# Patient Record
Sex: FEMALE | Race: BLACK OR AFRICAN AMERICAN | Employment: FULL TIME | URBAN - METROPOLITAN AREA
[De-identification: names, ages, dates, MRNs, and addresses within clinical notes are randomized per-mention and may not be internally consistent; named-entity substitution may affect disease eponyms.]

---
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
## 2022-07-21 ENCOUNTER — HOSPITAL ENCOUNTER (EMERGENCY)
Age: 22
Discharge: HOME | End: 2022-07-21
Attending: EMERGENCY MEDICINE
Payer: COMMERCIAL

## 2022-07-21 ENCOUNTER — APPOINTMENT (OUTPATIENT)
Dept: MRI IMAGING | Age: 22
End: 2022-07-21
Attending: EMERGENCY MEDICINE
Payer: COMMERCIAL

## 2022-07-21 VITALS
SYSTOLIC BLOOD PRESSURE: 115 MMHG | HEART RATE: 98 BPM | DIASTOLIC BLOOD PRESSURE: 73 MMHG | OXYGEN SATURATION: 99 % | RESPIRATION RATE: 17 BRPM | WEIGHT: 205.91 LBS | TEMPERATURE: 97 F

## 2022-07-21 DIAGNOSIS — R20.0 NUMBNESS AND TINGLING OF BOTH LOWER EXTREMITIES: Primary | ICD-10-CM

## 2022-07-21 DIAGNOSIS — R20.0 PERINEAL NUMBNESS: ICD-10-CM

## 2022-07-21 DIAGNOSIS — R20.2 NUMBNESS AND TINGLING OF BOTH LOWER EXTREMITIES: Primary | ICD-10-CM

## 2022-07-21 PROCEDURE — 72148 MRI LUMBAR SPINE W/O DYE: CPT

## 2022-07-21 PROCEDURE — 99284 EMERGENCY DEPT VISIT MOD MDM: CPT

## 2022-07-21 NOTE — ED NOTES
Triage note: Patient stating that she fell down a grassy hill on a skateboard about a month ago. Denies LOC but had back pain and pins and needles in lower back to legs. Tuesday patient woke with numbness to both legs, unable to feel her vagina, and unable to feel her rectum when pushing to have a BM. Patient having no difficulty walking.

## 2022-07-21 NOTE — Clinical Note
Sharmin Gonzalez was seen and treated in our emergency department on 7/21/2022. Light duty for 1 week. No significant lifting.     Ronald Arias MD

## 2022-07-21 NOTE — ED PROVIDER NOTES
History of asthma. She presents accompanied by her girlfriend with complaints of lower extremity numbness. She states that she fell approximately 1 month ago while attempting to ride a skateboard down a grassy hill. She landed on her right buttock and low back. She had pain and bruising after the fall.  2 days ago she awoke with lower extremity numbness from her hips down to her toes. She describes it as a pins-and-needles sensation. It has been persistent since then. She states that the numbness is worse over her posterior thighs and below her knees. She has better sensation over her anterior thighs. Yesterday she began to note numbness over her vaginal area when she wiped after urinating. She also (since yesterday) feels a numbing sensation around her anus when she pushes to have a bowel movement. She denies incontinence. No lower extremity weakness. No trouble walking. She denies any significant pain. Past Medical History:   Diagnosis Date    Asthma     Second hand smoke exposure        No past surgical history on file. History reviewed. No pertinent family history.     Social History     Socioeconomic History    Marital status: SINGLE     Spouse name: Not on file    Number of children: Not on file    Years of education: Not on file    Highest education level: Not on file   Occupational History    Not on file   Tobacco Use    Smoking status: Some Days    Smokeless tobacco: Current    Tobacco comments:     socially   Substance and Sexual Activity    Alcohol use: No    Drug use: Yes     Types: Marijuana    Sexual activity: Never   Other Topics Concern    Not on file   Social History Narrative    Not on file     Social Determinants of Health     Financial Resource Strain: Not on file   Food Insecurity: Not on file   Transportation Needs: Not on file   Physical Activity: Not on file   Stress: Not on file   Social Connections: Not on file   Intimate Partner Violence: Not on file   Housing Stability: Not on file         ALLERGIES: Patient has no known allergies. Review of Systems   All other systems reviewed and are negative. Vitals:    07/21/22 1909 07/21/22 1914   BP:  115/73   Pulse:  98   Resp:  17   Temp:  97 °F (36.1 °C)   SpO2:  99%   Weight: 93.4 kg (205 lb 14.6 oz)             Physical Exam  Vitals and nursing note reviewed. Constitutional:       Appearance: She is well-developed. HENT:      Head: Normocephalic and atraumatic. Eyes:      Conjunctiva/sclera: Conjunctivae normal.   Neck:      Trachea: No tracheal deviation. Cardiovascular:      Rate and Rhythm: Normal rate and regular rhythm. Heart sounds: Normal heart sounds. No murmur heard. No friction rub. No gallop. Pulmonary:      Effort: Pulmonary effort is normal.      Breath sounds: Normal breath sounds. Abdominal:      Palpations: Abdomen is soft. Tenderness: There is no abdominal tenderness. Musculoskeletal:         General: No deformity. Cervical back: Neck supple. Skin:     General: Skin is warm and dry. Neurological:      Mental Status: She is alert. Comments: oriented. Decreased sensation to light touch throughout her lower extremities. Normal strength. MDM         Procedures    Consult note: Dr. Quynh Bonilla (neurosurgery) -agrees with plan for MRI. Jonah Majano MD  8:38 PM    Progress Note:  Results, treatment, and follow up plan have been discussed with patient/girlfriend. Questions were answered. Jonah Majano MD  9:51 PM    Assessment/plan: She is status post fall 1 month ago. She presented today with a 2-day history of lower extremity numbness and tingling. She also complains of decreased sensation in her vaginal and anal areas. No incontinence. No weakness. She remains ambulatory. After discussion with neurosurgery, it was decided to perform an MRI to rule out cauda equina syndrome. MRI is negative for that. It does show:    1.   No evidence of cauda equina compression. 2.  No significant spinal canal stenosis. L4-L5 mild LEFT neuroforaminal  stenosis. Follow-up PCP. Return precautions.   Nehemias Zamora MD  9:54 PM

## 2022-07-22 NOTE — ED NOTES
Patient provided with hospital gown and blanket to change into for MRI. Educated to remove any jewelery or anything containing metal prior to procedure.

## 2022-07-25 ENCOUNTER — OFFICE VISIT (OUTPATIENT)
Dept: INTERNAL MEDICINE CLINIC | Age: 22
End: 2022-07-25
Payer: COMMERCIAL

## 2022-07-25 VITALS
WEIGHT: 210 LBS | BODY MASS INDEX: 33.75 KG/M2 | SYSTOLIC BLOOD PRESSURE: 106 MMHG | HEIGHT: 66 IN | RESPIRATION RATE: 16 BRPM | HEART RATE: 97 BPM | OXYGEN SATURATION: 95 % | DIASTOLIC BLOOD PRESSURE: 72 MMHG

## 2022-07-25 DIAGNOSIS — G57.93 NEUROPATHY INVOLVING BOTH LOWER EXTREMITIES: ICD-10-CM

## 2022-07-25 DIAGNOSIS — M54.41 ACUTE BILATERAL LOW BACK PAIN WITH BILATERAL SCIATICA: Primary | ICD-10-CM

## 2022-07-25 DIAGNOSIS — M54.42 ACUTE BILATERAL LOW BACK PAIN WITH BILATERAL SCIATICA: Primary | ICD-10-CM

## 2022-07-25 DIAGNOSIS — G57.03 PIRIFORMIS SYNDROME OF BOTH SIDES: ICD-10-CM

## 2022-07-25 PROCEDURE — 99214 OFFICE O/P EST MOD 30 MIN: CPT | Performed by: FAMILY MEDICINE

## 2022-07-25 RX ORDER — NAPROXEN 500 MG/1
500 TABLET ORAL 2 TIMES DAILY WITH MEALS
Qty: 30 TABLET | Refills: 0 | Status: SHIPPED | OUTPATIENT
Start: 2022-07-25

## 2022-07-25 RX ORDER — PREDNISONE 20 MG/1
20 TABLET ORAL 3 TIMES DAILY
Qty: 21 TABLET | Refills: 0 | Status: SHIPPED | OUTPATIENT
Start: 2022-07-25 | End: 2022-08-01

## 2022-07-25 NOTE — PROGRESS NOTES
Chief Complaint   Patient presents with    Generalized Body Aches     Patient is here for back,leg and butt pain      she is a 25y.o. year old female who presents for evaluation of  numbness, back, leg,butt and hip pain   Pain Assessment Encounter      Duong Olson  [birth year]  Onset of Symptoms: Patient states has had pain for weeks   ________________________________________________________________________  Description:Patient states she fell off a skate board a month ago, but did not start having pains until last Tuesday     Frequency: 5 times a day  Pain Scale:(1-10): 3  Trauma Hx: skate board  Hx of similar symptoms: No:   Radiation: YES, foot, ankle, leg, thigh, hip, and low back  Duration:  continuous      Progression: has worsened  What makes it better?: OTC meds  What makes it worse?:exercise and walking  Medications tried: acetaminophen, ibuprofen    Reviewed and agree with Nurse Note and duplicated in this note. Reviewed PmHx, RxHx, FmHx, SocHx, AllgHx and updated and dated in the chart. No family history on file.     Past Medical History:   Diagnosis Date    Asthma     Second hand smoke exposure       Social History     Socioeconomic History    Marital status: SINGLE   Tobacco Use    Smoking status: Some Days    Smokeless tobacco: Current    Tobacco comments:     socially   Substance and Sexual Activity    Alcohol use: No    Drug use: Yes     Types: Marijuana    Sexual activity: Never     Social Determinants of Health     Physical Activity: Insufficiently Active    Days of Exercise per Week: 2 days    Minutes of Exercise per Session: 30 min        Review of Systems - negative except as listed above      Objective:     Vitals:    07/25/22 1613   BP: 106/72   Pulse: 97   Resp: 16   SpO2: 95%   Weight: 210 lb (95.3 kg)   Height: 5' 5.5\" (1.664 m)       Physical Examination: General appearance - alert, well appearing, and in no distress  Back exam - normal reflexes and strength bilateral lower extremities, sensory exam intact bilateral lower extremities  Neurological - alert, oriented, normal speech, no focal findings or movement disorder noted  Musculoskeletal - no joint tenderness, deformity or swelling  Extremities - peripheral pulses normal, no pedal edema, no clubbing or cyanosis  Skin - normal coloration and turgor, no rashes, no suspicious skin lesions noted     Assessment/ Plan:   Diagnoses and all orders for this visit:    1. Acute bilateral low back pain with bilateral sciatica  -     REFERRAL TO PHYSICAL THERAPY    2. Piriformis syndrome of both sides  -     REFERRAL TO PHYSICAL THERAPY    3. Neuropathy involving both lower extremities  -     predniSONE (DELTASONE) 20 mg tablet; Take 1 Tablet by mouth three (3) times daily for 7 days.  -     naproxen (NAPROSYN) 500 mg tablet; Take 1 Tablet by mouth two (2) times daily (with meals). -     METABOLIC PANEL, BASIC; Future  -     MAGNESIUM; Future       Pathophysiology, recovery and rehabilitation process discussed and questions answered   Counseling for 30 Minutes of the total visit duration   Pictures and figures used as necessary   Provided reassurance   Monitor response to Physical Therapy   Recommend activity modification              1) Remember to stay active and/or exercise regularly (I suggest 30-45 minutes daily)   2) For reliable dietary information, go to www. [employer]AT[employer].org. You may wish to consider seeing the nutritionist at [facility] [phone], also consider the [address] I have discussed the diagnosis with the patient and the intended plan as seen in the above orders. The patient has received an after-visit summary and questions were answered concerning future plans.      Medication Side Effects and Warnings were discussed with patient,  Patient Labs were reviewed and or requested, and  Patient Past Records were reviewed and or requested  yes      Pt agrees to call or return to clinic and/or go to closest ER with any worsening of symptoms. This may include, but not limited to increased fever (>100.4) with NSAIDS or Tylenol, increased edema, confusion, rash, worsening of presenting symptoms. Please note that this dictation was completed with [employer], the computer voice recognition software. Quite often unanticipated grammatical, syntax, homophones, and other interpretive errors are inadvertently transcribed by the computer software. Please disregard these errors. Please excuse any errors that have escaped final proofreading. Thank you.

## 2022-07-27 LAB
BUN SERPL-MCNC: 9 MG/DL (ref 6–20)
BUN/CREAT SERPL: 11 (ref 9–23)
CALCIUM SERPL-MCNC: 9 MG/DL (ref 8.7–10.2)
CHLORIDE SERPL-SCNC: 104 MMOL/L (ref 96–106)
CO2 SERPL-SCNC: 21 MMOL/L (ref 20–29)
CREAT SERPL-MCNC: 0.84 MG/DL (ref 0.57–1)
EGFR: 103 ML/MIN/1.73
GLUCOSE SERPL-MCNC: 86 MG/DL (ref 65–99)
MAGNESIUM SERPL-MCNC: 2 MG/DL (ref 1.6–2.3)
POTASSIUM SERPL-SCNC: 4.5 MMOL/L (ref 3.5–5.2)
SODIUM SERPL-SCNC: 138 MMOL/L (ref 134–144)

## 2022-09-17 ENCOUNTER — APPOINTMENT (OUTPATIENT)
Dept: GENERAL RADIOLOGY | Age: 22
DRG: 203 | End: 2022-09-17
Attending: EMERGENCY MEDICINE
Payer: COMMERCIAL

## 2022-09-17 ENCOUNTER — HOSPITAL ENCOUNTER (INPATIENT)
Age: 22
LOS: 2 days | Discharge: HOME | DRG: 203 | End: 2022-09-20
Attending: EMERGENCY MEDICINE | Admitting: PEDIATRICS
Payer: COMMERCIAL

## 2022-09-17 DIAGNOSIS — J45.902 ASTHMA WITH STATUS ASTHMATICUS, UNSPECIFIED ASTHMA SEVERITY, UNSPECIFIED WHETHER PERSISTENT: Primary | ICD-10-CM

## 2022-09-17 LAB
ALBUMIN SERPL-MCNC: 3.4 G/DL (ref 3.5–5)
ALBUMIN/GLOB SERPL: 0.7 {RATIO} (ref 1.1–2.2)
ALP SERPL-CCNC: 74 U/L (ref 45–117)
ALT SERPL-CCNC: 20 U/L (ref 12–78)
ANION GAP SERPL CALC-SCNC: 7 MMOL/L (ref 5–15)
AST SERPL-CCNC: 9 U/L (ref 15–37)
BASOPHILS # BLD: 0 K/UL (ref 0–0.1)
BASOPHILS NFR BLD: 0 % (ref 0–1)
BILIRUB SERPL-MCNC: 0.3 MG/DL (ref 0.2–1)
BUN SERPL-MCNC: 12 MG/DL (ref 6–20)
BUN/CREAT SERPL: 12 (ref 12–20)
CALCIUM SERPL-MCNC: 8.6 MG/DL (ref 8.5–10.1)
CHLORIDE SERPL-SCNC: 107 MMOL/L (ref 97–108)
CO2 SERPL-SCNC: 25 MMOL/L (ref 21–32)
COMMENT, HOLDF: NORMAL
CREAT SERPL-MCNC: 0.98 MG/DL (ref 0.55–1.02)
DIFFERENTIAL METHOD BLD: NORMAL
EOSINOPHIL # BLD: 0.2 K/UL (ref 0–0.4)
EOSINOPHIL NFR BLD: 2 % (ref 0–7)
ERYTHROCYTE [DISTWIDTH] IN BLOOD BY AUTOMATED COUNT: 13.4 % (ref 11.5–14.5)
FLUAV RNA SPEC QL NAA+PROBE: NOT DETECTED
FLUBV RNA SPEC QL NAA+PROBE: NOT DETECTED
GLOBULIN SER CALC-MCNC: 4.7 G/DL (ref 2–4)
GLUCOSE SERPL-MCNC: 171 MG/DL (ref 65–100)
HCT VFR BLD AUTO: 40.4 % (ref 35–47)
HGB BLD-MCNC: 13.1 G/DL (ref 11.5–16)
IMM GRANULOCYTES # BLD AUTO: 0 K/UL (ref 0–0.04)
IMM GRANULOCYTES NFR BLD AUTO: 0 % (ref 0–0.5)
LYMPHOCYTES # BLD: 1.9 K/UL (ref 0.8–3.5)
LYMPHOCYTES NFR BLD: 19 % (ref 12–49)
MCH RBC QN AUTO: 27.1 PG (ref 26–34)
MCHC RBC AUTO-ENTMCNC: 32.4 G/DL (ref 30–36.5)
MCV RBC AUTO: 83.5 FL (ref 80–99)
MONOCYTES # BLD: 0.9 K/UL (ref 0–1)
MONOCYTES NFR BLD: 9 % (ref 5–13)
NEUTS SEG # BLD: 7.1 K/UL (ref 1.8–8)
NEUTS SEG NFR BLD: 70 % (ref 32–75)
NRBC # BLD: 0 K/UL (ref 0–0.01)
NRBC BLD-RTO: 0 PER 100 WBC
PLATELET # BLD AUTO: 360 K/UL (ref 150–400)
PMV BLD AUTO: 9.5 FL (ref 8.9–12.9)
POTASSIUM SERPL-SCNC: 3 MMOL/L (ref 3.5–5.1)
PROT SERPL-MCNC: 8.1 G/DL (ref 6.4–8.2)
RBC # BLD AUTO: 4.84 M/UL (ref 3.8–5.2)
SAMPLES BEING HELD,HOLD: NORMAL
SARS-COV-2, COV2: NOT DETECTED
SODIUM SERPL-SCNC: 139 MMOL/L (ref 136–145)
WBC # BLD AUTO: 10.2 K/UL (ref 3.6–11)

## 2022-09-17 PROCEDURE — 36415 COLL VENOUS BLD VENIPUNCTURE: CPT

## 2022-09-17 PROCEDURE — 80053 COMPREHEN METABOLIC PANEL: CPT

## 2022-09-17 PROCEDURE — 94644 CONT INHLJ TX 1ST HOUR: CPT

## 2022-09-17 PROCEDURE — 71045 X-RAY EXAM CHEST 1 VIEW: CPT

## 2022-09-17 PROCEDURE — 0202U NFCT DS 22 TRGT SARS-COV-2: CPT

## 2022-09-17 PROCEDURE — 74011000250 HC RX REV CODE- 250: Performed by: EMERGENCY MEDICINE

## 2022-09-17 PROCEDURE — 96374 THER/PROPH/DIAG INJ IV PUSH: CPT

## 2022-09-17 PROCEDURE — 85025 COMPLETE CBC W/AUTO DIFF WBC: CPT

## 2022-09-17 PROCEDURE — 96375 TX/PRO/DX INJ NEW DRUG ADDON: CPT

## 2022-09-17 PROCEDURE — 94640 AIRWAY INHALATION TREATMENT: CPT

## 2022-09-17 PROCEDURE — 87636 SARSCOV2 & INF A&B AMP PRB: CPT

## 2022-09-17 PROCEDURE — 99285 EMERGENCY DEPT VISIT HI MDM: CPT

## 2022-09-17 PROCEDURE — 74011250636 HC RX REV CODE- 250/636: Performed by: EMERGENCY MEDICINE

## 2022-09-17 RX ORDER — MAGNESIUM SULFATE HEPTAHYDRATE 40 MG/ML
2 INJECTION, SOLUTION INTRAVENOUS ONCE
Status: COMPLETED | OUTPATIENT
Start: 2022-09-17 | End: 2022-09-18

## 2022-09-17 RX ORDER — ALBUTEROL SULFATE 5 MG/ML
15 SOLUTION RESPIRATORY (INHALATION) CONTINUOUS
Status: DISPENSED | OUTPATIENT
Start: 2022-09-17 | End: 2022-09-18

## 2022-09-17 RX ADMIN — ALBUTEROL SULFATE 15 MG/HR: 5 SOLUTION RESPIRATORY (INHALATION) at 23:20

## 2022-09-17 RX ADMIN — SODIUM CHLORIDE 1000 ML: 9 INJECTION, SOLUTION INTRAVENOUS at 22:18

## 2022-09-17 RX ADMIN — ALBUTEROL SULFATE 1 DOSE: 2.5 SOLUTION RESPIRATORY (INHALATION) at 21:50

## 2022-09-17 RX ADMIN — METHYLPREDNISOLONE SODIUM SUCCINATE 125 MG: 125 INJECTION, POWDER, FOR SOLUTION INTRAMUSCULAR; INTRAVENOUS at 22:18

## 2022-09-17 RX ADMIN — MAGNESIUM SULFATE HEPTAHYDRATE 2 G: 40 INJECTION, SOLUTION INTRAVENOUS at 22:29

## 2022-09-17 RX ADMIN — ALBUTEROL SULFATE 1 DOSE: 2.5 SOLUTION RESPIRATORY (INHALATION) at 22:50

## 2022-09-17 RX ADMIN — ALBUTEROL SULFATE 1 DOSE: 2.5 SOLUTION RESPIRATORY (INHALATION) at 22:18

## 2022-09-18 PROBLEM — J45.902 STATUS ASTHMATICUS: Status: ACTIVE | Noted: 2022-09-18

## 2022-09-18 LAB
B PERT DNA SPEC QL NAA+PROBE: NOT DETECTED
BORDETELLA PARAPERTUSSIS PCR, BORPAR: NOT DETECTED
C PNEUM DNA SPEC QL NAA+PROBE: NOT DETECTED
FLUAV SUBTYP SPEC NAA+PROBE: NOT DETECTED
FLUBV RNA SPEC QL NAA+PROBE: NOT DETECTED
HADV DNA SPEC QL NAA+PROBE: NOT DETECTED
HCOV 229E RNA SPEC QL NAA+PROBE: NOT DETECTED
HCOV HKU1 RNA SPEC QL NAA+PROBE: NOT DETECTED
HCOV NL63 RNA SPEC QL NAA+PROBE: NOT DETECTED
HCOV OC43 RNA SPEC QL NAA+PROBE: NOT DETECTED
HMPV RNA SPEC QL NAA+PROBE: NOT DETECTED
HPIV1 RNA SPEC QL NAA+PROBE: NOT DETECTED
HPIV2 RNA SPEC QL NAA+PROBE: NOT DETECTED
HPIV3 RNA SPEC QL NAA+PROBE: NOT DETECTED
HPIV4 RNA SPEC QL NAA+PROBE: NOT DETECTED
M PNEUMO DNA SPEC QL NAA+PROBE: NOT DETECTED
RSV RNA SPEC QL NAA+PROBE: NOT DETECTED
RV+EV RNA SPEC QL NAA+PROBE: DETECTED
SARS-COV-2 PCR, COVPCR: NOT DETECTED

## 2022-09-18 PROCEDURE — 94762 N-INVAS EAR/PLS OXIMTRY CONT: CPT

## 2022-09-18 PROCEDURE — 94645 CONT INHLJ TX EACH ADDL HOUR: CPT

## 2022-09-18 PROCEDURE — 74011000250 HC RX REV CODE- 250: Performed by: PEDIATRICS

## 2022-09-18 PROCEDURE — 94640 AIRWAY INHALATION TREATMENT: CPT

## 2022-09-18 PROCEDURE — 2709999900 HC NON-CHARGEABLE SUPPLY

## 2022-09-18 PROCEDURE — 65613000000 HC RM ICU PEDIATRIC

## 2022-09-18 PROCEDURE — 77010033678 HC OXYGEN DAILY

## 2022-09-18 PROCEDURE — 74011000258 HC RX REV CODE- 258: Performed by: PEDIATRICS

## 2022-09-18 PROCEDURE — 74011250637 HC RX REV CODE- 250/637: Performed by: PEDIATRICS

## 2022-09-18 PROCEDURE — 74011250636 HC RX REV CODE- 250/636: Performed by: PEDIATRICS

## 2022-09-18 PROCEDURE — 77010033711 HC HIGH FLOW OXYGEN

## 2022-09-18 PROCEDURE — 77030029684 HC NEB SM VOL KT MONA -A

## 2022-09-18 PROCEDURE — 94760 N-INVAS EAR/PLS OXIMETRY 1: CPT

## 2022-09-18 RX ORDER — ALBUTEROL SULFATE 5 MG/ML
5 SOLUTION RESPIRATORY (INHALATION) CONTINUOUS
Status: DISCONTINUED | OUTPATIENT
Start: 2022-09-18 | End: 2022-09-19 | Stop reason: SDUPTHER

## 2022-09-18 RX ORDER — BUDESONIDE 0.5 MG/2ML
500 INHALANT ORAL
Status: DISCONTINUED | OUTPATIENT
Start: 2022-09-18 | End: 2022-09-19

## 2022-09-18 RX ORDER — DEXTROSE, SODIUM CHLORIDE, AND POTASSIUM CHLORIDE 5; .9; .15 G/100ML; G/100ML; G/100ML
0-100 INJECTION INTRAVENOUS CONTINUOUS
Status: DISCONTINUED | OUTPATIENT
Start: 2022-09-18 | End: 2022-09-20 | Stop reason: HOSPADM

## 2022-09-18 RX ORDER — ALBUTEROL SULFATE 5 MG/ML
15 SOLUTION RESPIRATORY (INHALATION) CONTINUOUS
Status: DISCONTINUED | OUTPATIENT
Start: 2022-09-18 | End: 2022-09-18

## 2022-09-18 RX ORDER — ACETAMINOPHEN 325 MG/1
650 TABLET ORAL
Status: DISCONTINUED | OUTPATIENT
Start: 2022-09-18 | End: 2022-09-19

## 2022-09-18 RX ORDER — IBUPROFEN 400 MG/1
400 TABLET ORAL
Status: DISCONTINUED | OUTPATIENT
Start: 2022-09-18 | End: 2022-09-18

## 2022-09-18 RX ORDER — ALBUTEROL SULFATE 0.83 MG/ML
5 SOLUTION RESPIRATORY (INHALATION)
Status: DISCONTINUED | OUTPATIENT
Start: 2022-09-18 | End: 2022-09-20 | Stop reason: HOSPADM

## 2022-09-18 RX ORDER — IBUPROFEN 400 MG/1
400 TABLET ORAL
Status: DISCONTINUED | OUTPATIENT
Start: 2022-09-18 | End: 2022-09-19

## 2022-09-18 RX ADMIN — ACETAMINOPHEN 650 MG: 325 TABLET ORAL at 16:15

## 2022-09-18 RX ADMIN — METHYLPREDNISOLONE SODIUM SUCCINATE 50 MG: 40 INJECTION, POWDER, FOR SOLUTION INTRAMUSCULAR; INTRAVENOUS at 17:56

## 2022-09-18 RX ADMIN — METHYLPREDNISOLONE SODIUM SUCCINATE 50 MG: 40 INJECTION, POWDER, FOR SOLUTION INTRAMUSCULAR; INTRAVENOUS at 12:13

## 2022-09-18 RX ADMIN — ACETAMINOPHEN 650 MG: 325 TABLET ORAL at 03:29

## 2022-09-18 RX ADMIN — ACETAMINOPHEN 650 MG: 325 TABLET ORAL at 08:50

## 2022-09-18 RX ADMIN — ALBUTEROL SULFATE 15 MG/HR: 5 SOLUTION RESPIRATORY (INHALATION) at 06:29

## 2022-09-18 RX ADMIN — ALBUTEROL SULFATE 15 MG/HR: 5 SOLUTION RESPIRATORY (INHALATION) at 10:26

## 2022-09-18 RX ADMIN — BUDESONIDE 500 MCG: 0.5 INHALANT RESPIRATORY (INHALATION) at 19:37

## 2022-09-18 RX ADMIN — FAMOTIDINE 20 MG: 10 INJECTION, SOLUTION INTRAVENOUS at 15:27

## 2022-09-18 RX ADMIN — IBUPROFEN 400 MG: 400 TABLET, FILM COATED ORAL at 12:47

## 2022-09-18 RX ADMIN — BUDESONIDE 500 MCG: 0.5 INHALANT RESPIRATORY (INHALATION) at 10:17

## 2022-09-18 RX ADMIN — POTASSIUM CHLORIDE, DEXTROSE MONOHYDRATE AND SODIUM CHLORIDE 100 ML/HR: 150; 5; 900 INJECTION, SOLUTION INTRAVENOUS at 03:31

## 2022-09-18 RX ADMIN — FAMOTIDINE 20 MG: 10 INJECTION, SOLUTION INTRAVENOUS at 03:29

## 2022-09-18 NOTE — ED NOTES
Pt educated regarding admission and plan of care. Pt verbalizes understanding. No need expressed at this time.

## 2022-09-18 NOTE — ED NOTES
Triage: Pt seen this Tuesday for Asthma exacerbation at PCP. Pt put on Prednisone and given albuterol inhaled. Pt took Neb treatment 20 min ago. No fevers, pt is COVID neg. In triage pt has expiratory wheezing, unable to speak I nfull sentences w/o SOB and periodic Desats. MD made aware. Pt given DuoNeb and placed on caridac monitor x3.

## 2022-09-18 NOTE — ED NOTES
TRANSFER - OUT REPORT:    Verbal report given to  Mayelin Ordonez RN (name) on Mekhi Lanza  being transferred to [facility]) for routine progression of care       Report consisted of patients Situation, Background, Assessment and   Recommendations(SBAR). Information from the following report(s) SBAR, ED Summary, Intake/Output, MAR, Recent Results and Cardiac Rhythm NSR was reviewed with the receiving nurse. Lines:   Peripheral IV 09/17/22 Right Antecubital (Active)   Site Assessment Clean, dry, & intact 09/17/22 2303   Phlebitis Assessment 0 09/17/22 2303   Infiltration Assessment 0 09/17/22 2303   Dressing Status Clean, dry, & intact 09/17/22 2303   Dressing Type Tape;Transparent 09/17/22 2303   Hub Color/Line Status Pink 09/17/22 2303        Opportunity for questions and clarification was provided.       Patient transported with:   Registered Nurse

## 2022-09-18 NOTE — ED PROVIDER NOTES
HPI     Please note that this dictation was completed with [employer], the computer voice recognition software. Quite often unanticipated grammatical, syntax, homophones, and other interpretive errors are inadvertently transcribed by the computer software. Please disregard these errors. Please excuse any errors that have escaped final proofreading. 74-year-old female with a history of asthma and 1 prior admission for such here with 5 days of cough, congestion and wheezing. She has been using albuterol MDI about every 3 hours without significant relief. She was seen at an urgent care 5 days ago and prescribed steroids and an inhaler. It sounds like she had a Medrol Dosepak and has 1 more day left tomorrow. Denies any fevers, vomiting, sick contacts or other complaints. Social history: Positive smoker. Positive marijuana. Past Medical History:   Diagnosis Date    Asthma     Second hand smoke exposure        History reviewed. No pertinent surgical history. History reviewed. No pertinent family history.     Social History     Socioeconomic History    Marital status: SINGLE     Spouse name: Not on file    Number of children: Not on file    Years of education: Not on file    Highest education level: Not on file   Occupational History    Not on file   Tobacco Use    Smoking status: Some Days    Smokeless tobacco: Current    Tobacco comments:     socially   Substance and Sexual Activity    Alcohol use: No    Drug use: Yes     Types: Marijuana    Sexual activity: Never   Other Topics Concern    Not on file   Social History Narrative    Not on file     Social Determinants of Health     Financial Resource Strain: Not on file   Food Insecurity: Not on file   Transportation Needs: Not on file   Physical Activity: Insufficiently Active    Days of Exercise per Week: 2 days    Minutes of Exercise per Session: 30 min   Stress: Not on file   Social Connections: Not on file   Intimate Partner Violence: Not At Risk Fear of Current or Ex-Partner: No    Emotionally Abused: No    Physically Abused: No    Sexually Abused: No   Housing Stability: Not on file         ALLERGIES: Patient has no known allergies. Review of Systems   Constitutional:  Negative for fever. HENT:  Positive for congestion and rhinorrhea. Respiratory:  Positive for cough, shortness of breath and wheezing. Gastrointestinal:  Negative for diarrhea and vomiting. Skin:  Negative for rash. All other systems reviewed and are negative. Vitals:    09/17/22 2117   Weight: 95.2 kg (209 lb 14.1 oz)            Physical Exam  Vitals and nursing note reviewed. Constitutional:       Appearance: Normal appearance. She is well-developed. She is ill-appearing. HENT:      Head: Normocephalic and atraumatic. Nose: No congestion or rhinorrhea. Mouth/Throat:      Mouth: Mucous membranes are moist.      Pharynx: No oropharyngeal exudate or posterior oropharyngeal erythema. Eyes:      General: No scleral icterus. Right eye: No discharge. Left eye: No discharge. Conjunctiva/sclera: Conjunctivae normal.   Cardiovascular:      Rate and Rhythm: Normal rate and regular rhythm. Heart sounds: Normal heart sounds. No murmur heard. No friction rub. No gallop. Pulmonary:      Effort: Pulmonary effort is normal. No respiratory distress. Breath sounds: Wheezing present. No rales. Abdominal:      General: There is no distension. Palpations: Abdomen is soft. Tenderness: There is no abdominal tenderness. There is no guarding. Musculoskeletal:         General: No swelling or deformity. Normal range of motion. Cervical back: Normal range of motion and neck supple. No rigidity. Right lower leg: No edema. Left lower leg: No edema. Lymphadenopathy:      Cervical: No cervical adenopathy. Skin:     General: Skin is warm and dry. Coloration: Skin is not jaundiced or pale. Findings: No rash. Neurological:      Mental Status: She is alert and oriented to person, place, and time. Comments: GINGER MOBLEY    32-year-old female here with diffuse wheezing. She is already on steroids and still significantly wheezing today. Afebrile. Will give IV steroids, labs, check chest x-ray. Rapid COVID. Procedures             Total critical care time (not including time spent performing separately reportable procedures): 36      10:59 PM  Improved aeration but still with moderate expiratory wheeze at the bases. Will admit to the adult hospitalist this patient is followed by general practitioner not pediatrician. Perfect Serve Consult for Admission  11:02 PM    ED Room Number: 10/10  Patient Name and age:  Sanya Schultz 23 y.o.  female  Working Diagnosis:   1. Asthma with status asthmaticus, unspecified asthma severity, unspecified whether persistent        COVID-19 Suspicion:  Other pending test  Sepsis present:  no  Reassessment needed: no  Code Status:  Full Code  Readmission: no  Isolation Requirements:  Other covid pending  Recommended Level of Care:  step down  Department:[facility] peds [phone]  Other:  on continuous nebs. Given iv steroids and magnesium. Recent Results (from the past 24 hour(s))   CBC WITH AUTOMATED DIFF    Collection Time: 09/17/22 10:10 PM   Result Value Ref Range    WBC 10.2 3.6 - 11.0 K/uL    RBC 4.84 3.80 - 5.20 M/uL    HGB 13.1 11.5 - 16.0 g/dL    HCT 40.4 35.0 - 47.0 %    MCV 83.5 80.0 - 99.0 FL    MCH 27.1 26.0 - 34.0 PG    MCHC 32.4 30.0 - 36.5 g/dL    RDW 13.4 11.5 - 14.5 %    PLATELET 059 957 - 988 K/uL    MPV 9.5 8.9 - 12.9 FL    NRBC 0.0 0  WBC    ABSOLUTE NRBC 0.00 0.00 - 0.01 K/uL    NEUTROPHILS 70 32 - 75 %    LYMPHOCYTES 19 12 - 49 %    MONOCYTES 9 5 - 13 %    EOSINOPHILS 2 0 - 7 %    BASOPHILS 0 0 - 1 %    IMMATURE GRANULOCYTES 0 0.0 - 0.5 %    ABS. NEUTROPHILS 7.1 1.8 - 8.0 K/UL    ABS. LYMPHOCYTES 1.9 0.8 - 3.5 K/UL    ABS.  MONOCYTES 0.9 0.0 - 1.0 K/UL    ABS. EOSINOPHILS 0.2 0.0 - 0.4 K/UL    ABS. BASOPHILS 0.0 0.0 - 0.1 K/UL    ABS. IMM. GRANS. 0.0 0.00 - 0.04 K/UL    DF AUTOMATED     METABOLIC PANEL, COMPREHENSIVE    Collection Time: 09/17/22 10:10 PM   Result Value Ref Range    Sodium 139 136 - 145 mmol/L    Potassium 3.0 (L) 3.5 - 5.1 mmol/L    Chloride 107 97 - 108 mmol/L    CO2 25 21 - 32 mmol/L    Anion gap 7 5 - 15 mmol/L    Glucose 171 (H) 65 - 100 mg/dL    BUN 12 6 - 20 MG/DL    Creatinine 0.98 0.55 - 1.02 MG/DL    BUN/Creatinine ratio 12 12 - 20      GFR est AA >60 >60 ml/min/1.73m2    GFR est non-AA >60 >60 ml/min/1.73m2    Calcium 8.6 8.5 - 10.1 MG/DL    Bilirubin, total 0.3 0.2 - 1.0 MG/DL    ALT (SGPT) 20 12 - 78 U/L    AST (SGOT) 9 (L) 15 - 37 U/L    Alk. phosphatase 74 45 - 117 U/L    Protein, total 8.1 6.4 - 8.2 g/dL    Albumin 3.4 (L) 3.5 - 5.0 g/dL    Globulin 4.7 (H) 2.0 - 4.0 g/dL    A-G Ratio 0.7 (L) 1.1 - 2.2     SAMPLES BEING HELD    Collection Time: 09/17/22 10:10 PM   Result Value Ref Range    SAMPLES BEING HELD 1red     COMMENT        Add-on orders for these samples will be processed based on acceptable specimen integrity and analyte stability, which may vary by analyte. XR CHEST PORT    Result Date: 9/17/2022  Clinical history: sob INDICATION:   sob COMPARISON: None FINDINGS: AP portable upright view of the chest demonstrates a stable  cardiopericardial silhouette. There is no pleural effusion. .There is no focal consolidation. .There is no pneumothorax. .     No acute process identified.

## 2022-09-18 NOTE — PROGRESS NOTES
TRANSFER - IN REPORT:    Verbal report received from [facility] ED(name) on Jeb Sickle  being received from [facility] ED(unit) for urgent transfer      Report consisted of patients Situation, Background, Assessment and   Recommendations(SBAR). Information from the following report(s) SBAR, Kardex, ED Summary, MAR, Recent Results, and Alarm Parameters  was reviewed with the receiving nurse. Opportunity for questions and clarification was provided. Assessment completed upon patients arrival to unit and care assumed.

## 2022-09-18 NOTE — ED NOTES
Upon reassessment after 3 back to back Adelina pt is noted to still have expiratory wheezing and diminished lung sounds in the bases. MD made aware. RT paged for continuous neb.

## 2022-09-18 NOTE — H&P
Pediatric  Intensive Care History and Physical      Critical Care Initial Evaluation Note: 9/18/2022 2:52 AM    Chief Complaint: trouble breathing x 5 days    HPI: 22-year-old female with a history of asthma and 1 prior admission for such here with 5 days of cough, congestion and wheezing. She has been using albuterol MDI about every 3 hours without significant relief. She was seen at an urgent care 5 days ago and prescribed steroids and an inhaler. It sounds like she had a Medrol Dosepak and has 1 more day left tomorrow. Denies any fevers, vomiting, sick contacts or other complaints. Social history: Positive smoker. Positive marijuana. ED course: duoneb x 3, solumedrol, Magnesium, continuous 15 mg/hr, CXR wnl, CBC wnl, Chem significant for hypokalemia, hyperglycemia. COVID/Flu neg. +rhino/enterovirus    Reports taking allergy medications inconsistently. Has been using albuterol MDI everyday for the past month and reports waking up at night with SOB. One hospitalization for asthma at ~14 y, saw pulmonology once as an outpatient with no further follow-up. Past Medical History:   Diagnosis Date    Asthma     Second hand smoke exposure       History reviewed. No pertinent surgical history. Prior to Admission medications    Medication Sig Start Date End Date Taking? Authorizing Provider   fexofenadine HCl (ALLEGRA PO) Take  by mouth. Yes Other, MD Mandeep   albuterol (PROVENTIL HFA, VENTOLIN HFA, PROAIR HFA) 90 mcg/actuation inhaler Take 2 Puffs by inhalation every four (4) hours as needed for Wheezing. 5/29/18  Yes Dottie Pressley MD   albuterol (PROVENTIL VENTOLIN) 2.5 mg /3 mL (0.083 %) nebulizer solution 3 mL by Nebulization route every four (4) hours as needed for Wheezing. 5/27/17  Yes Antony Nichols PA-C   naproxen (NAPROSYN) 500 mg tablet Take 1 Tablet by mouth two (2) times daily (with meals).  7/25/22   Iwona Foote MD     No Known Allergies   Social History     Tobacco Use    Smoking status: Some Days    Smokeless tobacco: Current    Tobacco comments:     socially   Substance Use Topics    Alcohol use: No      History reviewed. No pertinent family history. Immunizations are not recorded on the chart, but parent states child is up to date. Parent requested to bring in shot records. Review of Systems:  Pertinent items are noted in HPI. Objective:     Blood pressure 129/66, pulse (!) 117, temperature 98.1 °F (36.7 °C), resp. rate 19, height 1.7 m, weight 95.2 kg, SpO2 100 %. Temp (24hrs), Av.8 °F (37.1 °C), Min:98.1 °F (36.7 °C), Max:99.3 °F (37.4 °C)          Intake/Output Summary (Last 24 hours) at 2022 0252  Last data filed at 2022 2318  Gross per 24 hour   Intake 1000 ml   Output --   Net 1000 ml         Physical Exam:   Gen: NAD, talking in full sentences, non-toxic  HEENT: NCAT, no nasal congestion, MMM  Resp: poor air entry, diffuse end expiratory phase, mild tachypnea, no retractions noted  CVS: cap refill <2 s, no murmur or gallop  Abd: soft, ND/NT  Ext: WWP, MAEE  Neuro: alert, oriented    Data Review: I have personally reviewed all patient's lab work, radiology reports and images. Recent Results (from the past 24 hour(s))   CBC WITH AUTOMATED DIFF    Collection Time: 22 10:10 PM   Result Value Ref Range    WBC 10.2 3.6 - 11.0 K/uL    RBC 4.84 3.80 - 5.20 M/uL    HGB 13.1 11.5 - 16.0 g/dL    HCT 40.4 35.0 - 47.0 %    MCV 83.5 80.0 - 99.0 FL    MCH 27.1 26.0 - 34.0 PG    MCHC 32.4 30.0 - 36.5 g/dL    RDW 13.4 11.5 - 14.5 %    PLATELET 520 545 - 900 K/uL    MPV 9.5 8.9 - 12.9 FL    NRBC 0.0 0  WBC    ABSOLUTE NRBC 0.00 0.00 - 0.01 K/uL    NEUTROPHILS 70 32 - 75 %    LYMPHOCYTES 19 12 - 49 %    MONOCYTES 9 5 - 13 %    EOSINOPHILS 2 0 - 7 %    BASOPHILS 0 0 - 1 %    IMMATURE GRANULOCYTES 0 0.0 - 0.5 %    ABS. NEUTROPHILS 7.1 1.8 - 8.0 K/UL    ABS. LYMPHOCYTES 1.9 0.8 - 3.5 K/UL    ABS. MONOCYTES 0.9 0.0 - 1.0 K/UL    ABS.  EOSINOPHILS 0.2 0.0 - 0.4 K/UL    ABS. BASOPHILS 0.0 0.0 - 0.1 K/UL    ABS. IMM. GRANS. 0.0 0.00 - 0.04 K/UL    DF AUTOMATED     METABOLIC PANEL, COMPREHENSIVE    Collection Time: 09/17/22 10:10 PM   Result Value Ref Range    Sodium 139 136 - 145 mmol/L    Potassium 3.0 (L) 3.5 - 5.1 mmol/L    Chloride 107 97 - 108 mmol/L    CO2 25 21 - 32 mmol/L    Anion gap 7 5 - 15 mmol/L    Glucose 171 (H) 65 - 100 mg/dL    BUN 12 6 - 20 MG/DL    Creatinine 0.98 0.55 - 1.02 MG/DL    BUN/Creatinine ratio 12 12 - 20      GFR est AA >60 >60 ml/min/1.73m2    GFR est non-AA >60 >60 ml/min/1.73m2    Calcium 8.6 8.5 - 10.1 MG/DL    Bilirubin, total 0.3 0.2 - 1.0 MG/DL    ALT (SGPT) 20 12 - 78 U/L    AST (SGOT) 9 (L) 15 - 37 U/L    Alk. phosphatase 74 45 - 117 U/L    Protein, total 8.1 6.4 - 8.2 g/dL    Albumin 3.4 (L) 3.5 - 5.0 g/dL    Globulin 4.7 (H) 2.0 - 4.0 g/dL    A-G Ratio 0.7 (L) 1.1 - 2.2     SAMPLES BEING HELD    Collection Time: 09/17/22 10:10 PM   Result Value Ref Range    SAMPLES BEING HELD 1red     COMMENT        Add-on orders for these samples will be processed based on acceptable specimen integrity and analyte stability, which may vary by analyte. COVID-19 WITH INFLUENZA A/B    Collection Time: 09/17/22 10:10 PM   Result Value Ref Range    SARS-CoV-2 by PCR Not detected NOTD      Influenza A by PCR Not detected NOTD      Influenza B by PCR Not detected NOTD         XR CHEST PORT    Result Date: 9/17/2022  No acute process identified.         ACCESS:  PIV    Current Facility-Administered Medications   Medication Dose Route Frequency    dextrose 5% - 0.9% NaCl with KCl 20 mEq/L infusion  100 mL/hr IntraVENous CONTINUOUS    albuterol (PROVENTIL) 5 mg/mL nebulizer solution  15 mg/hr Inhalation CONTINUOUS    albuterol (PROVENTIL VENTOLIN) nebulizer solution 5 mg  5 mg Nebulization Q2H PRN    methylPREDNISolone (PF) (SOLU-MEDROL) injection 50 mg  50 mg IntraVENous Q6H    famotidine (PF) (PEPCID) 20 mg in 0.9% sodium chloride 10 mL IV SYRINGE  20 mg IntraVENous Q12H    albuterol (PROVENTIL) 5 mg/mL nebulizer solution  15 mg/hr Inhalation CONTINUOUS         Assessment:   23 y.o. female admitted with status asthmaticus in setting of rhino/entervirus infection. Patient at risk for acute life threatening respiratory deterioration requiring immediate life saving interventions.     Active Problems:    Status asthmaticus (9/18/2022)        Plan:   Resp: Albuterol continuous 15 mg/hr  Solumedrol  Monitor respiratory status closely    CV: hemodynamic monitoring per unit    FEN: Regular diet  IVF at M  famotidine    Neuro: tylenol PRN, motrin PRN    Procedures:  none    Consult:  Pulmonary    Activity: Bed Rest    Disposition and Family: Updated Family at bedside    Total time spent with patient: 35 minutes, providing clinical services, including repeated physical exams, review of medical record and discussions with family/patient, excluding time spent performing procedures, greater than 50% percent of this time was spent counseling and coordinating care

## 2022-09-19 PROCEDURE — 94640 AIRWAY INHALATION TREATMENT: CPT

## 2022-09-19 PROCEDURE — 99221 1ST HOSP IP/OBS SF/LOW 40: CPT | Performed by: NURSE PRACTITIONER

## 2022-09-19 PROCEDURE — 77010033711 HC HIGH FLOW OXYGEN

## 2022-09-19 PROCEDURE — 74011250636 HC RX REV CODE- 250/636: Performed by: PEDIATRICS

## 2022-09-19 PROCEDURE — 94645 CONT INHLJ TX EACH ADDL HOUR: CPT

## 2022-09-19 PROCEDURE — 74011000250 HC RX REV CODE- 250: Performed by: PEDIATRICS

## 2022-09-19 PROCEDURE — 94760 N-INVAS EAR/PLS OXIMETRY 1: CPT

## 2022-09-19 PROCEDURE — 74011250637 HC RX REV CODE- 250/637: Performed by: PEDIATRICS

## 2022-09-19 PROCEDURE — 65270000046 HC RM TELEMETRY

## 2022-09-19 PROCEDURE — 74011636637 HC RX REV CODE- 636/637: Performed by: PEDIATRICS

## 2022-09-19 PROCEDURE — 77030029684 HC NEB SM VOL KT MONA -A

## 2022-09-19 PROCEDURE — 74011000258 HC RX REV CODE- 258: Performed by: PEDIATRICS

## 2022-09-19 RX ORDER — ALBUTEROL SULFATE 0.83 MG/ML
5 SOLUTION RESPIRATORY (INHALATION)
Status: DISCONTINUED | OUTPATIENT
Start: 2022-09-19 | End: 2022-09-19

## 2022-09-19 RX ORDER — ALBUTEROL SULFATE 0.83 MG/ML
5 SOLUTION RESPIRATORY (INHALATION) EVERY 4 HOURS
Status: DISCONTINUED | OUTPATIENT
Start: 2022-09-19 | End: 2022-09-20 | Stop reason: HOSPADM

## 2022-09-19 RX ORDER — IBUPROFEN 600 MG/1
600 TABLET ORAL
Status: DISCONTINUED | OUTPATIENT
Start: 2022-09-19 | End: 2022-09-20 | Stop reason: HOSPADM

## 2022-09-19 RX ADMIN — IBUPROFEN 600 MG: 600 TABLET, FILM COATED ORAL at 16:36

## 2022-09-19 RX ADMIN — ALBUTEROL SULFATE 5 MG: 2.5 SOLUTION RESPIRATORY (INHALATION) at 13:09

## 2022-09-19 RX ADMIN — PREDNISONE 30 MG: 5 TABLET ORAL at 16:36

## 2022-09-19 RX ADMIN — BUDESONIDE 500 MCG: 0.5 INHALANT RESPIRATORY (INHALATION) at 07:33

## 2022-09-19 RX ADMIN — METHYLPREDNISOLONE SODIUM SUCCINATE 50 MG: 40 INJECTION, POWDER, FOR SOLUTION INTRAMUSCULAR; INTRAVENOUS at 01:23

## 2022-09-19 RX ADMIN — ALBUTEROL SULFATE 5 MG: 2.5 SOLUTION RESPIRATORY (INHALATION) at 07:33

## 2022-09-19 RX ADMIN — FAMOTIDINE 20 MG: 10 INJECTION INTRAVENOUS at 17:50

## 2022-09-19 RX ADMIN — ALBUTEROL SULFATE 5 MG: 2.5 SOLUTION RESPIRATORY (INHALATION) at 10:10

## 2022-09-19 RX ADMIN — PREDNISONE 30 MG: 5 TABLET ORAL at 11:28

## 2022-09-19 RX ADMIN — ALBUTEROL SULFATE 5 MG: 2.5 SOLUTION RESPIRATORY (INHALATION) at 19:48

## 2022-09-19 RX ADMIN — FAMOTIDINE 20 MG: 10 INJECTION, SOLUTION INTRAVENOUS at 06:03

## 2022-09-19 RX ADMIN — METHYLPREDNISOLONE SODIUM SUCCINATE 50 MG: 40 INJECTION, POWDER, FOR SOLUTION INTRAMUSCULAR; INTRAVENOUS at 06:03

## 2022-09-19 NOTE — H&P
[address] [facility] Adult  Hospitalist Group  History and Physical    Date of Service:  9/19/2022  Primary Care Provider: Eugene Henderson MD  Source of information: Chart review    Chief Complaint: Wheezing      History of Presenting Illness:   Simon Her is a 23 y.o. female with a PMH of Asthma and reported Tobacco Dependence and Marijuana Use who presents with an acute asthma exacerbation. Noted:She had been using albuterol MDI without significant relief, seen at urgent care 5 days before presentation and prescribed steroids and an inhaler without relief. Denied fevers, vomiting, sick contacts or other complaints. Patient was admitted to PICU, transferred out to hospitalist team 9/19. REVIEW OF SYSTEMS:  A comprehensive review of systems was negative except for that written in the History of Present Illness. Past Medical History:   Diagnosis Date    Asthma     Second hand smoke exposure       History reviewed. No pertinent surgical history. Prior to Admission medications    Medication Sig Start Date End Date Taking? Authorizing Provider   fexofenadine HCl (ALLEGRA PO) Take  by mouth. Yes Other, MD Mandeep   albuterol (PROVENTIL HFA, VENTOLIN HFA, PROAIR HFA) 90 mcg/actuation inhaler Take 2 Puffs by inhalation every four (4) hours as needed for Wheezing. 5/29/18  Yes Nelly Bojorquez MD   albuterol (PROVENTIL VENTOLIN) 2.5 mg /3 mL (0.083 %) nebulizer solution 3 mL by Nebulization route every four (4) hours as needed for Wheezing. 5/27/17  Yes Camilo Gilford, PA-C   naproxen (NAPROSYN) 500 mg tablet Take 1 Tablet by mouth two (2) times daily (with meals). 7/25/22   Eugene Henderson MD     No Known Allergies   History reviewed. No pertinent family history. Social History:  reports that she has been smoking. She uses smokeless tobacco. She reports current drug use. Drug: Marijuana. She reports that she does not drink alcohol.      Family and social history were personally reviewed, all pertinent and relevant details are outlined as above. Objective:   Visit Vitals  /62 (BP 1 Location: Left upper arm, BP Patient Position: Semi fowlers)   Pulse (!) 130   Temp 98.7 °F (37.1 °C)   Resp 27   Ht 5' 6.93\" (1.7 m)   Wt 95.2 kg (209 lb 14.1 oz)   SpO2 96%   BMI 32.94 kg/m²    O2 Flow Rate (L/min): 10 l/min O2 Device: None (Room air)    PHYSICAL EXAM:     Constitutional:  No acute distress, cooperative, pleasant    ENT:  Oral mucosa moist.    Resp:  Bilateral decreased bases, decreased air movement. No accessory muscle use. CV:  Regular rhythm, Tachycardic rate 100's, S1,S2.    GI/:  Soft, non distended, non tender, no guarding, BS present. Voids Freely. Musculoskeletal:  No edema, warm. Neurologic:  Moves all extremities. AAOx3. Skin:  w/d. Psych:  Good insight, Not anxious nor agitated. Data Review: All diagnostic labs and studies have been reviewed.     Abnormal Labs Reviewed   RESPIRATORY VIRUS PANEL W/COVID-19, PCR - Abnormal; Notable for the following components:       Result Value    Rhinovirus and Enterovirus Detected (*)     All other components within normal limits   METABOLIC PANEL, COMPREHENSIVE - Abnormal; Notable for the following components:    Potassium 3.0 (*)     Glucose 171 (*)     AST (SGOT) 9 (*)     Albumin 3.4 (*)     Globulin 4.7 (*)     A-G Ratio 0.7 (*)     All other components within normal limits       All Micro Results       Procedure Component Value Units Date/Time    RESPIRATORY VIRUS PANEL W/COVID-19, PCR [[id number]]  (Abnormal) Collected: 09/17/22 2209    Order Status: Completed Specimen: Nasopharyngeal Updated: 09/18/22 3369     Adenovirus Not detected        Coronavirus 229E Not detected        Coronavirus HKU1 Not detected        Coronavirus CVNL63 Not detected        Coronavirus OC43 Not detected        SARS-CoV-2, PCR Not detected        Metapneumovirus Not detected        Rhinovirus and Enterovirus Detected        Influenza A Not detected Influenza B Not detected        Parainfluenza 1 Not detected        Parainfluenza 2 Not detected        Parainfluenza 3 Not detected        Parainfluenza virus 4 Not detected        RSV by PCR Not detected        B. parapertussis, PCR Not detected        Bordetella pertussis - PCR Not detected        Chlamydophila pneumoniae DNA, QL, PCR Not detected        Mycoplasma pneumoniae DNA, QL, PCR Not detected       COVID-19 WITH INFLUENZA A/B [[id number]] Collected: 09/17/22 2210    Order Status: Completed Specimen: Nasopharyngeal Updated: 09/17/22 2311     SARS-CoV-2 by PCR Not detected        Comment: Not Detected results do not preclude SARS-CoV-2 infection and should not be used as the sole basis for patient management decisions. Results must be combined with clinical observations, patient history, and epidemiological information. Influenza A by PCR Not detected        Influenza B by PCR Not detected        Comment: Testing was performed using nadine Moon SARS-CoV-2 and Influenza A/B nucleic acid assay. This test is a multiplex Real-Time Reverse Transcriptase Polymerase Chain Reaction (RT-PCR) based in vitro diagnostic test intended for the qualitative detection of nucleic acids from SARS-CoV-2, Influenza A, and Influenza B in nasopharyngeal and nasal swab specimens for use under the FDA's Emergency Use Authorization (EUA) only. Fact sheet for Patients: FindDrives.pl  Fact sheet for Healthcare Providers: FindDrives.pl         COVID-19 RAPID TEST [[id number]] Collected: 09/17/22 2210    Order Status: Canceled             IMAGING:   XR CHEST PORT   Final Result   No acute process identified. ECG/ECHO:  No results found for this or any previous visit. Assessment:   Given the patient's current clinical presentation, there is a high level of concern for decompensation if discharged from the emergency department.  Complex decision making was performed, which includes reviewing the patient's available past medical records, laboratory results, and imaging studies. Active Problems:    Status asthmaticus (9/18/2022)      Plan:   Status Asthmaticus: transfer from peds ICU 9/19, stable dawood 96-98% on RA. -duonebs  -pepcid  -prednisone  -tele    Tobacco dependence:  -advised cessation    Marijuana Use:  -advised cessation      DVTppx: SCDs  GIppx: Pepcid  Code Status: Full Code  Diet: Regular  Activity: OOB to chair TID and PRN when tolerated  Discharge: likely home  Ambulates: independently      DIET: PEDIATRIC DIET Regular   ISOLATION PRECAUTIONS: Contact  CODE STATUS: Full Code   DVT PROPHYLAXIS: SCDs  FUNCTIONAL STATUS PRIOR TO HOSPITALIZATION: independent. Ambulatory status/function: By self   EARLY MOBILITY ASSESSMENT: when respiratory stable, up ad chana  ANTICIPATED DISCHARGE: 24-48 hours. ANTICIPATED DISPOSITION: Home  EMERGENCY CONTACT/SURROGATE DECISION MAKER: Gurpreet Casper [phone]    CRITICAL CARE WAS PERFORMED FOR THIS ENCOUNTER: NO.      Signed By: Stuart Mclaughlin NP     September 19, 2022         Please note that this dictation may have been completed with [employer], the computer voice recognition software. Quite often unanticipated grammatical, syntax, homophones, and other interpretive errors are inadvertently transcribed by the computer software. Please disregard these errors. Please excuse any errors that have escaped final proofreading.

## 2022-09-19 NOTE — PROGRESS NOTES
Bedside and Verbal shift change report given to Pradeep Gutierrez RN (oncoming nurse) by Marily Elder (offgoing nurse). Report included the following information SBAR, Kardex, Intake/Output, MAR, and Recent Results.

## 2022-09-19 NOTE — CONSULTS
Pediatric Lung Care Consult  Note    Patient: Reji Mukherjee MRN: [id number]      YOB: 2000  Age: 23 y.o. Sex: female    Date of Consult: 9/19/2022       History of Present Illness    I was asked to see Reji Mukherjee, a 23 y.o., admitted to the pediatric ICU for asthma exacerbation secondary to + REV infection. Per patient, her asthma has not been well controlled lately, and she has been using albuterol every day, often several times per day. Not sleeping well, and often has shortness of breath. NO daily controller. + smoker, but is trying to decrease amount    This illness, had symptoms x 5 days prior to arrival to ED    ED course: Duonebs x 3, solumedrol, magnesium, continuous albuterol    Chest xray: no acute process     History obtained from patient, chart review        Physical Exam  Physical Exam  Vitals and nursing note reviewed. Constitutional:       Appearance: She is obese. HENT:      Head: Normocephalic. Nose: Congestion present. Eyes:      General:         Right eye: No discharge. Left eye: No discharge. Cardiovascular:      Rate and Rhythm: Normal rate and regular rhythm. Heart sounds: Normal heart sounds. Pulmonary:      Breath sounds: Wheezing present. Comments: Inspiratory and expiratory wheezing noted mostly to left upper lobe, + bronchospastic cough noted  Musculoskeletal:         General: Normal range of motion. Cervical back: Normal range of motion. Skin:     General: Skin is warm and dry. Neurological:      Mental Status: She is alert and oriented to person, place, and time. Review of Systems  Review of Systems   Respiratory:  Positive for cough and wheezing. Past Medical History/Family History/Environment  Past Medical History:   Diagnosis Date    Asthma     Second hand smoke exposure      History reviewed. No pertinent surgical history. History reviewed. No pertinent family history.   No specialty comments available. Allergies  No Known Allergies    Current Medications  Current Facility-Administered Medications   Medication Dose Route Frequency    ibuprofen (MOTRIN) tablet 600 mg  600 mg Oral Q6H PRN    predniSONE (DELTASONE) tablet 30 mg  30 mg Oral BID WITH MEALS    albuterol (PROVENTIL VENTOLIN) nebulizer solution 5 mg  5 mg Nebulization Q3H    famotidine (PF) (PEPCID) 20 mg in 0.9% sodium chloride 10 mL injection  20 mg IntraVENous Q12H    dextrose 5% - 0.9% NaCl with KCl 20 mEq/L infusion  0-100 mL/hr IntraVENous CONTINUOUS    albuterol (PROVENTIL VENTOLIN) nebulizer solution 5 mg  5 mg Nebulization Q2H PRN       Results  No results found for this or any previous visit (from the past 24 hour(s)). DIAGNOSES  1. Asthma with status asthmaticus, unspecified asthma severity, unspecified whether persistent        Impression/Recommendations:  Dexter Francisco is a 23year old with history of moderate persistent asthma, uncontrolled admitted to PICU for exacerbation secondary to + REV infection. Has tolerated albuterol spaced to every 3 hours and is maintaining saturations on RA. Discussed with patient, rationale for daily ICS controller, and would recommend Symbicort 160, 2 puffs BID due to severity of symptoms and patient's need for daily albuterol. Will follow outpatient in Pediatric Lung Care in 3-4 weeks, and facilitate transfer to adult pulmonology. Thank you for the consult. If you have any questions regarding this evaluation, please do not hestitate to call me. 45 minutes were spent in face-to-face care of this patient, of which more than 50% was spent counseling and discussing the diagnosis, benefits/drawbacks of treatment, anticipatory guidance and future care plans regarding the The encounter diagnosis was Asthma with status asthmaticus, unspecified asthma severity, unspecified whether persistent.     Elizabeth Souza, FNP-C  Pediatric Lung Care  [phone]

## 2022-09-19 NOTE — PROGRESS NOTES
Critical Care Daily Progress Note/TransferNote    Subjective:     Admission Date: 9/17/2022     Complaint:    Guera Alvarenga feels improved today but has chest tightness on inspiration and some headaches. Interval history:  - Status asthmaticus : Advanced to Q 2H treatments this morning  - Headache : She has headaches that are not relieved by tylenol. Current Facility-Administered Medications   Medication Dose Route Frequency    ibuprofen (MOTRIN) tablet 600 mg  600 mg Oral Q6H PRN    predniSONE (DELTASONE) tablet 30 mg  30 mg Oral BID WITH MEALS    albuterol (PROVENTIL VENTOLIN) nebulizer solution 5 mg  5 mg Nebulization Q3H    dextrose 5% - 0.9% NaCl with KCl 20 mEq/L infusion  0-100 mL/hr IntraVENous CONTINUOUS    albuterol (PROVENTIL VENTOLIN) nebulizer solution 5 mg  5 mg Nebulization Q2H PRN    famotidine (PF) (PEPCID) 20 mg in 0.9% sodium chloride 10 mL IV SYRINGE  20 mg IntraVENous Q12H    albuterol (PROVENTIL) 5 mg/mL nebulizer solution  5 mg/hr Inhalation CONTINUOUS       Review of Systems:  A comprehensive review of systems was negative except for: Respiratory: positive for cough, wheezing, or chest tightness. Headache    Objective:     Visit Vitals  BP (!) 117/59   Pulse (!) 108   Temp 98.6 °F (37 °C)   Resp 18   Ht 1.7 m   Wt 95.2 kg   SpO2 94%   BMI 32.94 kg/m²       Intake and Output:     Intake/Output Summary (Last 24 hours) at 9/19/2022 1051  Last data filed at 9/19/2022 0800  Gross per 24 hour   Intake 675 ml   Output --   Net 675 ml         Chest tube OUT    NG Tube IN:    NG Tube OUT:      Physical Exam:   EXAM:  Gen: Well-appearing, speaking in full sentences  HEENT: NCAT MMM no injection  Resp: Fair AE throughout, no wheeze, no crackles, no increased WOB  CV: S1S2 RRR no murmur  Abd: Soft NDNT +BS  Ext: Warm and well-perfused  Neuro: Alert and oriented, good tone, non-focal exam    Data Review:     No results found for this or any previous visit (from the past 24 hour(s)).     Images:    CXR Results  (Last 48 hours)                 09/17/22 2150  XR CHEST PORT Final result    Impression:  No acute process identified. Narrative:  Clinical history: sob   INDICATION:   sob   COMPARISON: None       FINDINGS:   AP portable upright view of the chest demonstrates a stable  cardiopericardial   silhouette. There is no pleural effusion. .There is no focal consolidation. .There   is no pneumothorax. .                      Hemodynamics:              CVP:               PIV in place    Oxygen Therapy:    Oxygen Therapy  O2 Sat (%): 94 % (09/19/22 1010)  Pulse via Oximetry: 112 beats per minute (09/19/22 1010)  O2 Device: None (Room air) (09/19/22 1010)  Skin Assessment: Clean, dry, & intact (09/19/22 0341)  Skin Protection for O2 Device: No (09/18/22 0202)  O2 Flow Rate (L/min): 10 l/min (09/19/22 0500)  O2 Temperature: 97.5 °F (36.4 °C) (09/19/22 0341)  FIO2 (%): 21 % (09/19/22 0500)22 y.o. Ventilator:         Assessment:   23 y.o. female who is admitted with:Status asthmaticus, REV+, now tolerating albuterol every 2 hours. Active Problems:    Status asthmaticus (9/18/2022)        Plan:   Continue albuterol every 2 hours, wean as tolerated  Switch to prednisone 30mg BID x 4 days  D/C zantac  D/C tylenol  Increase dose of Motrin  Encourage oral intake    Procedures:  None    Consult:  Pulmonary    Activity: Ambulate    Disposition and Family: Stable to transfer to floor.   Patient endorsed to Adult hospitalist.     Gianni Biggs MD    Total time spent with patient: 28 minutes,providing clinical services, including repeated physical exams, review of medical record and discussions with family/patient, excluding time spent performing procedures, with greater than 50% of this time spent counseling and coordinating care

## 2022-09-20 ENCOUNTER — TELEPHONE (OUTPATIENT)
Dept: PULMONOLOGY | Age: 22
End: 2022-09-20

## 2022-09-20 VITALS
OXYGEN SATURATION: 99 % | SYSTOLIC BLOOD PRESSURE: 123 MMHG | BODY MASS INDEX: 33.24 KG/M2 | DIASTOLIC BLOOD PRESSURE: 77 MMHG | RESPIRATION RATE: 19 BRPM | HEART RATE: 94 BPM | HEIGHT: 67 IN | WEIGHT: 211.8 LBS | TEMPERATURE: 98.4 F

## 2022-09-20 PROCEDURE — 94664 DEMO&/EVAL PT USE INHALER: CPT

## 2022-09-20 PROCEDURE — 74011250636 HC RX REV CODE- 250/636: Performed by: PEDIATRICS

## 2022-09-20 PROCEDURE — 94640 AIRWAY INHALATION TREATMENT: CPT

## 2022-09-20 PROCEDURE — 74011250637 HC RX REV CODE- 250/637: Performed by: PEDIATRICS

## 2022-09-20 PROCEDURE — 74011636637 HC RX REV CODE- 636/637: Performed by: PEDIATRICS

## 2022-09-20 PROCEDURE — 74011000250 HC RX REV CODE- 250: Performed by: PEDIATRICS

## 2022-09-20 PROCEDURE — 74011000250 HC RX REV CODE- 250: Performed by: NURSE PRACTITIONER

## 2022-09-20 RX ORDER — PREDNISONE 10 MG/1
30 TABLET ORAL 2 TIMES DAILY WITH MEALS
Qty: 6 TABLET | Refills: 0 | Status: SHIPPED | OUTPATIENT
Start: 2022-09-20 | End: 2022-09-22

## 2022-09-20 RX ORDER — BUDESONIDE AND FORMOTEROL FUMARATE DIHYDRATE 160; 4.5 UG/1; UG/1
2 AEROSOL RESPIRATORY (INHALATION) EVERY 12 HOURS
Qty: 1 EACH | Refills: 3 | Status: SHIPPED | OUTPATIENT
Start: 2022-09-20 | End: 2022-10-13 | Stop reason: SDUPTHER

## 2022-09-20 RX ADMIN — PREDNISONE 30 MG: 5 TABLET ORAL at 09:54

## 2022-09-20 RX ADMIN — IBUPROFEN 600 MG: 600 TABLET, FILM COATED ORAL at 06:04

## 2022-09-20 RX ADMIN — ALBUTEROL SULFATE 5 MG: 2.5 SOLUTION RESPIRATORY (INHALATION) at 10:58

## 2022-09-20 RX ADMIN — ALBUTEROL SULFATE 5 MG: 2.5 SOLUTION RESPIRATORY (INHALATION) at 07:44

## 2022-09-20 RX ADMIN — ALBUTEROL SULFATE 5 MG: 2.5 SOLUTION RESPIRATORY (INHALATION) at 04:04

## 2022-09-20 RX ADMIN — ALBUTEROL SULFATE 5 MG: 2.5 SOLUTION RESPIRATORY (INHALATION) at 00:06

## 2022-09-20 RX ADMIN — IBUPROFEN 600 MG: 600 TABLET, FILM COATED ORAL at 00:04

## 2022-09-20 RX ADMIN — FAMOTIDINE 20 MG: 10 INJECTION INTRAVENOUS at 05:59

## 2022-09-20 NOTE — PROGRESS NOTES
TRANSITION OF CARE  RUR--9%  Disposition--Home with family assist.  Transport--Family    Patient's primary family contact: mother Ezequiel Rapp                       father Chris Minor Management Interventions  PCP Verified by CM: Yes  Transition of Care Consult (CM Consult): Other (None)  Physical Therapy Consult: No  Occupational Therapy Consult: No  Speech Therapy Consult: No  Support Systems: Parent(s)  Confirm Follow Up Transport: Family  The Plan for Transition of Care is Related to the Following Treatment Goals : Home with family assistance  Discharge Location  Patient Expects to be Discharged to[de-identified] Home with family assistance    Reason for Admission:  Asthma                   RUR Score:            9%           Plan for utilizing home health:      None    PCP: First and Last name:  Alma Cruz MD   Name of Practice:    Are you a current patient: Yes   Approximate date of last visit: July 2022   Can you participate in a virtual visit with your PCP:                     Current Advanced Directive/Advance Care Plan: Full Code    Healthcare Decision Maker:   Click here to complete [address] including selection of the Healthcare Decision Maker Relationship (ie \"Primary\")                         Transition of Care Plan:     CM met with patient. Patient lives with parents and siblimgs ages 24 and 16. Patient reports good family /social support. Patient is ambulatory and expects return to independent functioning and employment. Patient confirmed PCP, health insurance, and prescription coverage.

## 2022-09-20 NOTE — TELEPHONE ENCOUNTER
----- Message from Chan Ordaz NP sent at 9/19/2022  5:05 PM EDT -----  Regarding: Appt  Hello,   Can we make this pt an appt for October 13th at 2:30 PM? Thank you!     Hospital follow up

## 2022-09-20 NOTE — PROGRESS NOTES
Bedside and Verbal shift change report given to Charlie Rico RN (oncoming nurse) by Raoul Delong RN (offgoing nurse). Report included the following information SBAR, Kardex, ED Summary, Procedure Summary, Intake/Output, MAR, Recent Results, and Cardiac Rhythm NSR .

## 2022-09-20 NOTE — DISCHARGE SUMMARY
Discharge Summary       PATIENT ID: Serge Geronimo  MRN: [id number]   YOB: 2000    DATE OF ADMISSION: 9/17/2022  9:11 PM    DATE OF DISCHARGE: 9/20/2022     PRIMARY CARE PROVIDER: Saloni Dash MD     ATTENDING PHYSICIAN: Anahi Davison MD  DISCHARGING PROVIDER: Jamari Lee NP    To contact this individual call [phone] and ask the  to page. If unavailable ask to be transferred the Adult Hospitalist Department. CONSULTATIONS: IP CONSULT TO PEDIATRIC PULMONARY    PROCEDURES/SURGERIES: * No surgery found *    DISCHARGE DIAGNOSES:     Status Asthmaticus, Tobacco Dependence      ADMISSION SUMMARY AND HOSPITAL COURSE:   Serge Geronimo is a 23 y.o. female with a PMH of Asthma and reported Tobacco Dependence and Marijuana Use who presents with an acute asthma exacerbation. Noted:She had been using albuterol MDI without significant relief, seen at urgent care 5 days before presentation and prescribed steroids and an inhaler without relief. Denied fevers, vomiting, sick contacts or other complaints. Patient was admitted to PICU, transferred out to hospitalist team 9/19. DISCHARGE DIAGNOSES / PLAN:      Status Asthmaticus: transfer from peds ICU 9/19, stable dawood 96-98% on RA. -duonebs  -pepcid  -prednisone  -tele  -Symbicort at discharge pr Peds CC team- follow out patient     Tobacco dependence:  -advised cessation     Marijuana Use:  -advised cessation    BMI: Body mass index is 33.24 kg/m². . This patient: Meets criteria for obesity given BMI >/= 30 and < 40 due to excess calories/nutritional. Weight loss and lifestyle modifications should be encouraged as an outpatient. PENDING TEST RESULTS:   At the time of discharge the following test results are still pending: none. ADDITIONAL CARE RECOMMENDATIONS:     You have been deemed stable for discharge and are being discharged home with family. You have prescribed steroids, please complete course.      Should you need medication refills beyond what we have prescribed for you, you will need to contact your primary care provider for refills. Return to the ER or notify your primary care office if you have a fever greater than 101.5 associated with chills, chest pain. NOTIFY YOUR PHYSICIAN FOR ANY OF THE FOLLOWING:   Fever over 101 degrees for 24 hours. Chest pain, shortness of breath, fever, chills, nausea, vomiting, diarrhea, change in mentation, falling, weakness, bleeding. Severe pain or pain not relieved by medications, as well as any other signs or symptoms that you may have questions about. FOLLOW UP APPOINTMENTS:    Follow-up Information       Follow up With Specialties Details Why Contact Info    Can Zamora MD Family Medicine, Sports Medicine Physician Follow up in 3 day(s)  [address]  [phone]      Ridge Richardson MD Pediatric Critical Care Medicine Schedule an appointment as soon as possible for a visit in 3 week(s) [facility] [address]  [phone]                 DIET: Regular Diet    ACTIVITY: Activity as tolerated and no driving for today      EQUIPMENT needed: none. DISCHARGE MEDICATIONS:  Discharge Medication List as of 9/20/2022 11:51 AM        START taking these medications    Details   predniSONE (DELTASONE) 10 mg tablet Take 30 mg by mouth two (2) times daily (with meals) for 3 doses. Indications: worsening asthma, Normal, Disp-6 Tablet, R-0      budesonide-formoteroL (Symbicort) 160-4.5 mcg/actuation HFAA Take 2 Puffs by inhalation every twelve (12) hours. , Normal, Disp-1 Each, R-3           CONTINUE these medications which have NOT CHANGED    Details   naproxen (NAPROSYN) 500 mg tablet Take 1 Tablet by mouth two (2) times daily (with meals). , Normal, Disp-30 Tablet, R-0      fexofenadine HCl (ALLEGRA PO) Take  by mouth., Historical Med      albuterol (PROVENTIL HFA, VENTOLIN HFA, PROAIR HFA) 90 mcg/actuation inhaler Take 2 Puffs by inhalation every four (4) hours as needed for Wheezing., Print, Disp-1 Inhaler, R-0      albuterol (PROVENTIL VENTOLIN) 2.5 mg /3 mL (0.083 %) nebulizer solution 3 mL by Nebulization route every four (4) hours as needed for Wheezing., Print, Disp-24 Each, R-0             DISPOSITION:   X Home With:family   OT  PT  HH  RN       Long term SNF/Inpatient Rehab    Independent/assisted living    Hospice    Other:       PATIENT CONDITION AT DISCHARGE:     Functional status    Poor     Deconditioned    X Independent      Cognition    X Lucid     Forgetful     Dementia      Catheters/lines (plus indication)    Wilhelm     PICC     PEG    X None      Code status    X Full code     DNR      PHYSICAL EXAMINATION AT DISCHARGE:    Constitutional:  No acute distress, cooperative, pleasant    ENT:  Oral mucosa moist.    Resp:  Bilateral decreased bases, decreased air movement. No accessory muscle use. CV:  Regular rhythm, Tachycardic rate 100's, S1,S2.    GI/:  Soft, non distended, non tender, no guarding, BS present. Voids Freely. Musculoskeletal:  No edema, warm. Neurologic:  Moves all extremities. AAOx3. Skin:  w/d. Psych:  Good insight, Not anxious nor agitated.          CHRONIC MEDICAL DIAGNOSES:  Problem List as of 9/20/2022 Date Reviewed: 6/5/2018            Codes Class Noted - Resolved    Status asthmaticus ICD-10-CM: J45.902  ICD-9-CM: 493.91  9/18/2022 - Present        RESOLVED: Asthma with status asthmaticus ICD-10-CM: J45.902  ICD-9-CM: 493.91  5/27/2018 - 6/5/2018        RESOLVED: Hypokalemia ICD-10-CM: E87.6  ICD-9-CM: 276.8  5/27/2018 - 6/5/2018           Greater than 30 minutes were spent with the patient on counseling and coordination of care    Signed:   Deshawn Kwon NP  9/20/2022  1:15 PM

## 2022-09-20 NOTE — DISCHARGE INSTRUCTIONS
Discharge Instructions       PATIENT ID: Bushra Garcia  MRN: [id number]   YOB: 2000    DATE OF ADMISSION: 9/17/22  DATE OF DISCHARGE: 9/20/2022    PRIMARY CARE PROVIDER: Julia Limon MD     ATTENDING PHYSICIAN: Sharad Saucedo MD  DISCHARGING PROVIDER: Angelica Valdez NP    To contact this individual call [phone] and ask the  to page. If unavailable ask to be transferred the Adult Hospitalist Department. DISCHARGE DIAGNOSES: Status Asthmaticus, Tobacco Dependence    CONSULTATIONS: Pediatrics    PROCEDURES/SURGERIES: * No surgery found *    PENDING TEST RESULTS:   At the time of discharge the following test results are still pending: none. FOLLOW UP APPOINTMENTS:     Deysi Krishnan MD (Pediatric CC Medicine)- 3 weeks    Julia Limon MD (PCP)- 3-5 days    ADDITIONAL CARE RECOMMENDATIONS:   You have been deemed stable for discharge and are being discharged home with family. You have prescribed steroids, please complete course. Should you need medication refills beyond what we have prescribed for you, you will need to contact your primary care provider for refills. Return to the ER or notify your primary care office if you have a fever greater than 101.5 associated with chills, chest pain. DIET: Regular Diet    ACTIVITY: Activity as tolerated and no driving for today      EQUIPMENT needed: none. DISCHARGE MEDICATIONS:   See Medication Reconciliation Form    It is important that you take the medication exactly as they are prescribed. Keep your medication in the bottles provided by the pharmacist and keep a list of the medication names, dosages, and times to be taken in your wallet. Do not take other medications without consulting your doctor. NOTIFY YOUR PHYSICIAN FOR ANY OF THE FOLLOWING:   Fever over 101 degrees for 24 hours. Chest pain, shortness of breath, fever, chills, nausea, vomiting, diarrhea, change in mentation, falling, weakness, bleeding. Severe pain or pain not relieved by medications. Or, any other signs or symptoms that you may have questions about. DISPOSITION:  X  Home With:   OT  PT  HH  RN       SNF/Inpatient Rehab/LTAC    Independent/assisted living    Hospice    Other:     CDMP Checked: Yes X     PROBLEM LIST Updated:   Yes X       Signed:   Belinda Rose NP  9/20/2022  10:56 AM

## 2022-09-20 NOTE — PROGRESS NOTES
[address] September 20, 1430       RE: Reji Mukherjee      To Whom It May Concern,    This is to certify that Reji Mukherjee was hospitalized from 9/17/22- 9/20/22. She may return to work within 2 days of discharge from the hospital.     Please feel free to contact my office if you have any questions or concerns. Thank you for your assistance in this matter.       Sincerely,  Tiara Dominguez NP

## 2022-09-22 ENCOUNTER — OFFICE VISIT (OUTPATIENT)
Dept: INTERNAL MEDICINE CLINIC | Age: 22
End: 2022-09-22
Payer: COMMERCIAL

## 2022-09-22 VITALS
BODY MASS INDEX: 32.8 KG/M2 | SYSTOLIC BLOOD PRESSURE: 107 MMHG | TEMPERATURE: 98.6 F | WEIGHT: 209 LBS | HEIGHT: 67 IN | RESPIRATION RATE: 16 BRPM | HEART RATE: 87 BPM | OXYGEN SATURATION: 98 % | DIASTOLIC BLOOD PRESSURE: 72 MMHG

## 2022-09-22 DIAGNOSIS — K21.9 GASTROESOPHAGEAL REFLUX DISEASE WITHOUT ESOPHAGITIS: ICD-10-CM

## 2022-09-22 DIAGNOSIS — J45.902 ASTHMA WITH STATUS ASTHMATICUS, UNSPECIFIED ASTHMA SEVERITY, UNSPECIFIED WHETHER PERSISTENT: Primary | ICD-10-CM

## 2022-09-22 PROCEDURE — 99214 OFFICE O/P EST MOD 30 MIN: CPT | Performed by: FAMILY MEDICINE

## 2022-09-22 RX ORDER — FAMOTIDINE 40 MG/1
40 TABLET, FILM COATED ORAL DAILY
Qty: 30 TABLET | Refills: 1 | Status: SHIPPED | OUTPATIENT
Start: 2022-09-22

## 2022-09-22 NOTE — PROGRESS NOTES
Chief Complaint   Patient presents with    Hospital Follow Up     Patient is here for a hospital follow up      she is a 23y.o. year old female who presents for evaluation of asthma on 917 and was placed in the peds ICU for 2 days. Patient states she went to the ER twice for asthma. Patient states she was wheezing and having shortness of breath. Per patient she feels better and no shortness of breath but she also states she currently have a cough and headaches. Reviewed and agree with Nurse Note and duplicated in this note. Reviewed PmHx, RxHx, FmHx, SocHx, AllgHx and updated and dated in the chart. No family history on file.     Past Medical History:   Diagnosis Date    Asthma     Second hand smoke exposure       Social History     Socioeconomic History    Marital status: SINGLE   Tobacco Use    Smoking status: Some Days    Smokeless tobacco: Current    Tobacco comments:     socially   Substance and Sexual Activity    Alcohol use: No    Drug use: Yes     Types: Marijuana    Sexual activity: Never     Social Determinants of Health     Physical Activity: Insufficiently Active    Days of Exercise per Week: 2 days    Minutes of Exercise per Session: 30 min        Review of Systems - negative except as listed above      Objective:     Vitals:    09/22/22 1147   Resp: 16   Weight: 209 lb (94.8 kg)   Height: 5' 6.93\" (1.7 m)       Physical Examination: General appearance - alert, well appearing, and in no distress  Eyes - pupils equal and reactive, extraocular eye movements intact  Ears - bilateral TM's and external ear canals normal  Nose - normal and patent, no erythema, discharge or polyps  Mouth - mucous membranes moist, pharynx normal without lesions  Neck - supple, no significant adenopathy  Chest -mild wheezes noted right lower lobe wheezes, rales or rhonchi, symmetric air entry  Heart - normal rate, regular rhythm, normal S1, S2, no murmurs, rubs, clicks or gallops  Abdomen - soft, nontender, nondistended, no masses or organomegaly  Musculoskeletal - no joint tenderness, deformity or swelling  Extremities - peripheral pulses normal, no pedal edema, no clubbing or cyanosis  Skin - normal coloration and turgor, no rashes, no suspicious skin lesions noted     Assessment/ Plan:   Diagnoses and all orders for this visit:    1. Asthma with status asthmaticus, unspecified asthma severity, unspecified whether persistent  Patient is well controlled with Symbicort, recommend she continue with Zyrtec over-the-counter and as needed albuterol inhaler  2. Gastroesophageal reflux disease without esophagitis    Other orders  -     famotidine (PEPCID) 40 mg tablet; Take 1 Tablet by mouth daily. I have discussed the diagnosis with the patient and the intended plan as seen in the above orders. The patient has received an after-visit summary and questions were answered concerning future plans. Medication Side Effects and Warnings were discussed with patient,  Patient Labs were reviewed and or requested, and  Patient Past Records were reviewed and or requested  yes       Pt agrees to call or return to clinic and/or go to closest ER with any worsening of symptoms. This may include, but not limited to increased fever (>100.4) with NSAIDS or Tylenol, increased edema, confusion, rash, worsening of presenting symptoms. Please note that this dictation was completed with [employer], the computer voice recognition software. Quite often unanticipated grammatical, syntax, homophones, and other interpretive errors are inadvertently transcribed by the computer software. Please disregard these errors. Please excuse any errors that have escaped final proofreading. Thank you.

## 2022-10-13 ENCOUNTER — HOSPITAL ENCOUNTER (OUTPATIENT)
Dept: PEDIATRIC PULMONOLOGY | Age: 22
Discharge: HOME | End: 2022-10-13
Payer: COMMERCIAL

## 2022-10-13 ENCOUNTER — OFFICE VISIT (OUTPATIENT)
Dept: PULMONOLOGY | Age: 22
End: 2022-10-13
Payer: COMMERCIAL

## 2022-10-13 VITALS
DIASTOLIC BLOOD PRESSURE: 67 MMHG | HEART RATE: 100 BPM | SYSTOLIC BLOOD PRESSURE: 118 MMHG | HEIGHT: 65 IN | RESPIRATION RATE: 18 BRPM | WEIGHT: 214 LBS | BODY MASS INDEX: 35.65 KG/M2 | OXYGEN SATURATION: 91 % | TEMPERATURE: 98 F

## 2022-10-13 DIAGNOSIS — R06.89 DIFFICULTY BREATHING: ICD-10-CM

## 2022-10-13 DIAGNOSIS — J45.40 MODERATE PERSISTENT ASTHMA WITHOUT COMPLICATION: ICD-10-CM

## 2022-10-13 DIAGNOSIS — R06.89 DIFFICULTY BREATHING: Primary | ICD-10-CM

## 2022-10-13 PROCEDURE — 99204 OFFICE O/P NEW MOD 45 MIN: CPT | Performed by: NURSE PRACTITIONER

## 2022-10-13 PROCEDURE — 94010 BREATHING CAPACITY TEST: CPT | Performed by: PEDIATRICS

## 2022-10-13 PROCEDURE — 94010 BREATHING CAPACITY TEST: CPT

## 2022-10-13 RX ORDER — ALBUTEROL SULFATE 0.83 MG/ML
2.5 SOLUTION RESPIRATORY (INHALATION)
Qty: 50 EACH | Refills: 3 | Status: SHIPPED | OUTPATIENT
Start: 2022-10-13

## 2022-10-13 RX ORDER — BUDESONIDE AND FORMOTEROL FUMARATE DIHYDRATE 160; 4.5 UG/1; UG/1
2 AEROSOL RESPIRATORY (INHALATION) EVERY 12 HOURS
Qty: 1 EACH | Refills: 3 | Status: SHIPPED | OUTPATIENT
Start: 2022-10-13

## 2022-10-13 RX ORDER — MONTELUKAST SODIUM 10 MG/1
TABLET ORAL
COMMUNITY

## 2022-10-13 NOTE — PATIENT INSTRUCTIONS
Continue Symbicort 160, 2 puffs twice per day     Continue albuterol every 4-6 hours as needed for cough/wheeze     Continue allergy medications daily     Seek emergency care for increased work of breathing     Return to office again in 3 months, sooner if needed

## 2022-10-13 NOTE — PROGRESS NOTES
Chief Complaint   Patient presents with    New Patient    [address] Follow Up     Per patient, pt has been doing well since hospital discharge.

## 2022-10-13 NOTE — PROGRESS NOTES
[facility],6Th [address]  Pediatric Lung Care  [address] [facility] [address]  [place], [address]  [phone]          Date of Visit: 10/13/2022 - NEW PATIENT    Mary Aguilar  YOB: 2000    CHIEF COMPLAINT: Hospital follow up asthma     HISTORY OF PRESENT ILLNESS:  Mary Aguilar is a 23 y.o. female was seen today in the pediatric lung care clinic as a new patient for evaluation. Additional data collected prior to this visit by outside providers was reviewed prior to this appointment. Mattie Martinez was previously followed in this clinic, but lost to follow up until recent PICU admission for exacerbation secondary to + REV infection. Discharged on 9/20/2022    Started Symbicort at time of discharge  Recent cold, but only lasted 2-3 days   No increased SOB   Has smoked only a few times since d/c  Pt reports breathing is much easier now     ALLERGIES: No Known Allergies    MEDICATIONS:   Current Outpatient Medications   Medication Sig Dispense Refill    montelukast (SINGULAIR) 10 mg tablet montelukast 10 mg tablet   TAKE 1 TABLET BY MOUTH DAILY      famotidine (PEPCID) 40 mg tablet Take 1 Tablet by mouth daily. 30 Tablet 1    budesonide-formoteroL (Symbicort) 160-4.5 mcg/actuation HFAA Take 2 Puffs by inhalation every twelve (12) hours. 1 Each 3    fexofenadine HCl (ALLEGRA PO) Take  by mouth. albuterol (PROVENTIL HFA, VENTOLIN HFA, PROAIR HFA) 90 mcg/actuation inhaler Take 2 Puffs by inhalation every four (4) hours as needed for Wheezing. 1 Inhaler 0    albuterol (PROVENTIL VENTOLIN) 2.5 mg /3 mL (0.083 %) nebulizer solution 3 mL by Nebulization route every four (4) hours as needed for Wheezing. 24 Each 0    naproxen (NAPROSYN) 500 mg tablet Take 1 Tablet by mouth two (2) times daily (with meals).  (Patient not taking: No sig reported) 30 Tablet 0       PAST MEDICAL HISTORY:   Past Medical History:   Diagnosis Date    Asthma     Second hand smoke exposure        PAST SURGICAL HISTORY: None FAMILY HISTORY: No pertinent history    SOCIAL: Lives at home with family     Vaccines: up to date by report      REVIEW OF SYSTEMS:  See HPI       PHYSICAL EXAMINATION:  Vitals:    10/13/22 1418   BP: 118/67   BP 1 Location: Left upper arm   BP Patient Position: Sitting   BP Cuff Size: Large adult   Pulse: 100   Temp: 98 °F (36.7 °C)   TempSrc: Oral   Resp: 18   Height: 5' 5.39\" (1.661 m)   Weight: 214 lb (97.1 kg)   SpO2: 91%     General: well-looking, well-nourished, not in distress, no dysmorphisms. Awake, alert and oriented. Overweight  HEENT - normocephalic, neck supple, full ROM, no neck masses or lymphadenopathy. Anicteric sclera, pink palpebral conjunctiva. External canals clear without discharge. No nasal congestion, crusting or discharge. Moist mucous membranes. No oral lesions. Lungs: clear to auscultation bilaterally. No rales or wheezes. Cardiovascular - normal rate, regular rhythm. No murmurs. Musculoskeletal - no deformities, full ROM. Skin: no rashes, warm and dry        ASSESSMENT/IMPRESSION: Rebecca Yusuf is 23 y.o. with moderate persistent asthma with recent exacerbation and hospitalization. Symptoms have improved since starting Symbicort 160, 2 puffs BID, and additional albuterol use has decreased. No ER or urgent care visits. Pt reports no SOB or coughing and has decreased smoking frequency since hospitalization. Emphasized with patient how adherence to ICS/LABA and decreased smoking is contributing to control of her asthma. Lungs clear on exam, and PE reassuring. See below for further recommendations.      RECOMMENDATIONS:  Continue Symbicort 160, 2 puffs twice per day     Continue albuterol every 4-6 hours as needed for cough/wheeze     Continue allergy medications daily     Seek emergency care for increased work of breathing     Return to office again in 3 months, sooner if needed     Total time spent: 45 minutes with more than 50% spent discussing the diagnosis and medication education with the patient and family. All patient and caregiver questions and concerns were addressed during the visit. Major risks, benefits, and side-effects of therapy were discussed.      ALISSA Bella  Family Nurse Practitioner  [facility]

## 2022-10-18 NOTE — PROGRESS NOTES
Pulmonary Function Testing:  FVC: Normal  FEV1: Normal  FEV1/FVC: Normal  No concavity to the flow volume curve.   Interpretation:  Normal spirometry    José Miguel Zamora MD  Pediatric Pulmonology

## 2024-01-08 RX ORDER — ALBUTEROL SULFATE 2.5 MG/3ML
SOLUTION RESPIRATORY (INHALATION)
Qty: 150 ML | OUTPATIENT
Start: 2024-01-08